# Patient Record
Sex: FEMALE | Race: ASIAN | NOT HISPANIC OR LATINO | Employment: UNEMPLOYED | URBAN - METROPOLITAN AREA
[De-identification: names, ages, dates, MRNs, and addresses within clinical notes are randomized per-mention and may not be internally consistent; named-entity substitution may affect disease eponyms.]

---

## 2023-08-16 ENCOUNTER — OFFICE VISIT (OUTPATIENT)
Dept: FAMILY MEDICINE CLINIC | Facility: CLINIC | Age: 28
End: 2023-08-16

## 2023-08-16 VITALS
HEIGHT: 63 IN | HEART RATE: 72 BPM | WEIGHT: 158 LBS | DIASTOLIC BLOOD PRESSURE: 72 MMHG | SYSTOLIC BLOOD PRESSURE: 116 MMHG | BODY MASS INDEX: 28 KG/M2

## 2023-08-16 DIAGNOSIS — E03.9 HYPOTHYROIDISM, UNSPECIFIED TYPE: Primary | ICD-10-CM

## 2023-08-17 LAB
ALBUMIN SERPL-MCNC: 4.6 G/DL (ref 4–5)
ALBUMIN/GLOB SERPL: 1.6 {RATIO} (ref 1.2–2.2)
ALP SERPL-CCNC: 68 IU/L (ref 44–121)
ALT SERPL-CCNC: 23 IU/L (ref 0–32)
APPEARANCE UR: CLEAR
AST SERPL-CCNC: 19 IU/L (ref 0–40)
BACTERIA #/AREA URNS HPF: ABNORMAL /[HPF]
BASOPHILS # BLD AUTO: 0.1 X10E3/UL (ref 0–0.2)
BASOPHILS NFR BLD AUTO: 1 %
BILIRUB SERPL-MCNC: <0.2 MG/DL (ref 0–1.2)
BILIRUB UR QL STRIP: NEGATIVE
BUN SERPL-MCNC: 7 MG/DL (ref 6–20)
BUN/CREAT SERPL: 11 (ref 9–23)
CALCIUM SERPL-MCNC: 9.4 MG/DL (ref 8.7–10.2)
CASTS URNS QL MICRO: ABNORMAL /LPF
CHLORIDE SERPL-SCNC: 102 MMOL/L (ref 96–106)
CHOLEST SERPL-MCNC: 164 MG/DL (ref 100–199)
CHOLEST/HDLC SERPL: 3.5 RATIO (ref 0–4.4)
CO2 SERPL-SCNC: 24 MMOL/L (ref 20–29)
COLOR UR: YELLOW
CREAT SERPL-MCNC: 0.62 MG/DL (ref 0.57–1)
EGFRCR SERPLBLD CKD-EPI 2021: 124 ML/MIN/1.73
EOSINOPHIL # BLD AUTO: 0.1 X10E3/UL (ref 0–0.4)
EOSINOPHIL NFR BLD AUTO: 1 %
EPI CELLS #/AREA URNS HPF: ABNORMAL /HPF (ref 0–10)
ERYTHROCYTE [DISTWIDTH] IN BLOOD BY AUTOMATED COUNT: 12.2 % (ref 11.7–15.4)
GLOBULIN SER CALC-MCNC: 2.8 G/DL (ref 1.5–4.5)
GLUCOSE SERPL-MCNC: 111 MG/DL (ref 70–99)
GLUCOSE UR QL STRIP: NEGATIVE
HCT VFR BLD AUTO: 36.6 % (ref 34–46.6)
HCV IGG SERPL QL IA: NON REACTIVE
HDLC SERPL-MCNC: 47 MG/DL
HGB BLD-MCNC: 12.2 G/DL (ref 11.1–15.9)
HGB UR QL STRIP: ABNORMAL
IMM GRANULOCYTES # BLD AUTO: 0 X10E3/UL (ref 0–0.1)
IMM GRANULOCYTES NFR BLD AUTO: 0 %
KETONES UR QL STRIP: NEGATIVE
LDLC SERPL CALC-MCNC: 97 MG/DL (ref 0–99)
LEUKOCYTE ESTERASE UR QL STRIP: ABNORMAL
LYMPHOCYTES # BLD AUTO: 3.6 X10E3/UL (ref 0.7–3.1)
LYMPHOCYTES NFR BLD AUTO: 37 %
MCH RBC QN AUTO: 29 PG (ref 26.6–33)
MCHC RBC AUTO-ENTMCNC: 33.3 G/DL (ref 31.5–35.7)
MCV RBC AUTO: 87 FL (ref 79–97)
MICRO URNS: ABNORMAL
MONOCYTES # BLD AUTO: 0.5 X10E3/UL (ref 0.1–0.9)
MONOCYTES NFR BLD AUTO: 5 %
NEUTROPHILS # BLD AUTO: 5.4 X10E3/UL (ref 1.4–7)
NEUTROPHILS NFR BLD AUTO: 56 %
NITRITE UR QL STRIP: NEGATIVE
PH UR STRIP: 7 [PH] (ref 5–7.5)
PLATELET # BLD AUTO: 340 X10E3/UL (ref 150–450)
POTASSIUM SERPL-SCNC: 3.9 MMOL/L (ref 3.5–5.2)
PROT SERPL-MCNC: 7.4 G/DL (ref 6–8.5)
PROT UR QL STRIP: NEGATIVE
RBC # BLD AUTO: 4.21 X10E6/UL (ref 3.77–5.28)
RBC #/AREA URNS HPF: ABNORMAL /HPF (ref 0–2)
SODIUM SERPL-SCNC: 141 MMOL/L (ref 134–144)
SP GR UR STRIP: 1.01 (ref 1–1.03)
T4 FREE SERPL-MCNC: 1.2 NG/DL (ref 0.82–1.77)
TRIGL SERPL-MCNC: 109 MG/DL (ref 0–149)
TSH SERPL DL<=0.005 MIU/L-ACNC: 0.91 UIU/ML (ref 0.45–4.5)
UROBILINOGEN UR STRIP-MCNC: 0.2 MG/DL (ref 0.2–1)
VLDLC SERPL CALC-MCNC: 20 MG/DL (ref 5–40)
WBC # BLD AUTO: 9.6 X10E3/UL (ref 3.4–10.8)
WBC #/AREA URNS HPF: ABNORMAL /HPF (ref 0–5)

## 2024-07-03 ENCOUNTER — OFFICE VISIT (OUTPATIENT)
Dept: FAMILY MEDICINE CLINIC | Facility: CLINIC | Age: 29
End: 2024-07-03
Payer: COMMERCIAL

## 2024-07-03 VITALS
OXYGEN SATURATION: 98 % | DIASTOLIC BLOOD PRESSURE: 78 MMHG | RESPIRATION RATE: 18 BRPM | HEART RATE: 78 BPM | TEMPERATURE: 98 F | SYSTOLIC BLOOD PRESSURE: 116 MMHG | BODY MASS INDEX: 28.88 KG/M2 | WEIGHT: 163 LBS | HEIGHT: 63 IN

## 2024-07-03 DIAGNOSIS — E66.3 OVERWEIGHT (BMI 25.0-29.9): ICD-10-CM

## 2024-07-03 DIAGNOSIS — N92.6 IRREGULAR MENSES: Primary | ICD-10-CM

## 2024-07-03 PROBLEM — E03.9 HYPOTHYROIDISM: Status: RESOLVED | Noted: 2023-08-16 | Resolved: 2024-07-03

## 2024-07-03 PROCEDURE — 99213 OFFICE O/P EST LOW 20 MIN: CPT | Performed by: INTERNAL MEDICINE

## 2024-07-04 PROBLEM — N92.6 IRREGULAR MENSES: Status: ACTIVE | Noted: 2024-07-04

## 2024-07-04 PROBLEM — E66.3 OVERWEIGHT (BMI 25.0-29.9): Status: ACTIVE | Noted: 2024-07-04

## 2024-10-09 ENCOUNTER — LAB (OUTPATIENT)
Dept: FAMILY MEDICINE CLINIC | Facility: CLINIC | Age: 29
End: 2024-10-09
Payer: COMMERCIAL

## 2024-10-09 DIAGNOSIS — N92.6 IRREGULAR MENSES: Primary | ICD-10-CM

## 2024-10-09 DIAGNOSIS — E66.3 OVERWEIGHT (BMI 25.0-29.9): ICD-10-CM

## 2024-10-10 LAB
ALBUMIN SERPL-MCNC: 4.5 G/DL (ref 3.5–5.2)
ALBUMIN/GLOB SERPL: 1.6 G/DL
ALP SERPL-CCNC: 69 U/L (ref 39–117)
ALT SERPL-CCNC: 20 U/L (ref 1–33)
APPEARANCE UR: CLEAR
AST SERPL-CCNC: 20 U/L (ref 1–32)
BACTERIA #/AREA URNS HPF: NORMAL /HPF
BASOPHILS # BLD AUTO: 0.04 10*3/MM3 (ref 0–0.2)
BASOPHILS NFR BLD AUTO: 0.5 % (ref 0–1.5)
BILIRUB SERPL-MCNC: 0.3 MG/DL (ref 0–1.2)
BILIRUB UR QL STRIP: NEGATIVE
BUN SERPL-MCNC: 10 MG/DL (ref 6–20)
BUN/CREAT SERPL: 17.9 (ref 7–25)
CALCIUM SERPL-MCNC: 9.5 MG/DL (ref 8.6–10.5)
CASTS URNS MICRO: NORMAL
CHLORIDE SERPL-SCNC: 102 MMOL/L (ref 98–107)
CHOLEST SERPL-MCNC: 160 MG/DL (ref 0–200)
CO2 SERPL-SCNC: 27.3 MMOL/L (ref 22–29)
COLOR UR: YELLOW
CREAT SERPL-MCNC: 0.56 MG/DL (ref 0.57–1)
EGFRCR SERPLBLD CKD-EPI 2021: 126.1 ML/MIN/1.73
EOSINOPHIL # BLD AUTO: 0.11 10*3/MM3 (ref 0–0.4)
EOSINOPHIL NFR BLD AUTO: 1.5 % (ref 0.3–6.2)
EPI CELLS #/AREA URNS HPF: NORMAL /HPF
ERYTHROCYTE [DISTWIDTH] IN BLOOD BY AUTOMATED COUNT: 11.9 % (ref 12.3–15.4)
GLOBULIN SER CALC-MCNC: 2.8 GM/DL
GLUCOSE SERPL-MCNC: 85 MG/DL (ref 65–99)
GLUCOSE UR QL STRIP: NEGATIVE
HCT VFR BLD AUTO: 38.4 % (ref 34–46.6)
HDLC SERPL-MCNC: 41 MG/DL (ref 40–60)
HGB BLD-MCNC: 12.5 G/DL (ref 12–15.9)
HGB UR QL STRIP: NEGATIVE
IMM GRANULOCYTES # BLD AUTO: 0.01 10*3/MM3 (ref 0–0.05)
IMM GRANULOCYTES NFR BLD AUTO: 0.1 % (ref 0–0.5)
KETONES UR QL STRIP: NEGATIVE
LDLC SERPL CALC-MCNC: 107 MG/DL (ref 0–100)
LDLC/HDLC SERPL: 2.61 {RATIO}
LEUKOCYTE ESTERASE UR QL STRIP: ABNORMAL
LYMPHOCYTES # BLD AUTO: 3.48 10*3/MM3 (ref 0.7–3.1)
LYMPHOCYTES NFR BLD AUTO: 46.7 % (ref 19.6–45.3)
MCH RBC QN AUTO: 28.9 PG (ref 26.6–33)
MCHC RBC AUTO-ENTMCNC: 32.6 G/DL (ref 31.5–35.7)
MCV RBC AUTO: 88.9 FL (ref 79–97)
MONOCYTES # BLD AUTO: 0.35 10*3/MM3 (ref 0.1–0.9)
MONOCYTES NFR BLD AUTO: 4.7 % (ref 5–12)
NEUTROPHILS # BLD AUTO: 3.46 10*3/MM3 (ref 1.7–7)
NEUTROPHILS NFR BLD AUTO: 46.5 % (ref 42.7–76)
NITRITE UR QL STRIP: NEGATIVE
NRBC BLD AUTO-RTO: 0 /100 WBC (ref 0–0.2)
PH UR STRIP: 7 [PH] (ref 5–8)
PLATELET # BLD AUTO: 387 10*3/MM3 (ref 140–450)
POTASSIUM SERPL-SCNC: 4.6 MMOL/L (ref 3.5–5.2)
PROT SERPL-MCNC: 7.3 G/DL (ref 6–8.5)
PROT UR QL STRIP: NEGATIVE
RBC # BLD AUTO: 4.32 10*6/MM3 (ref 3.77–5.28)
RBC #/AREA URNS HPF: NORMAL /HPF
SODIUM SERPL-SCNC: 138 MMOL/L (ref 136–145)
SP GR UR STRIP: 1.01 (ref 1–1.03)
TRIGL SERPL-MCNC: 60 MG/DL (ref 0–150)
UROBILINOGEN UR STRIP-MCNC: ABNORMAL MG/DL
VLDLC SERPL CALC-MCNC: 12 MG/DL (ref 5–40)
WBC # BLD AUTO: 7.45 10*3/MM3 (ref 3.4–10.8)
WBC #/AREA URNS HPF: NORMAL /HPF

## 2024-11-26 ENCOUNTER — OFFICE VISIT (OUTPATIENT)
Dept: FAMILY MEDICINE CLINIC | Facility: CLINIC | Age: 29
End: 2024-11-26
Payer: COMMERCIAL

## 2024-11-26 VITALS
OXYGEN SATURATION: 94 % | SYSTOLIC BLOOD PRESSURE: 118 MMHG | RESPIRATION RATE: 18 BRPM | HEIGHT: 63 IN | TEMPERATURE: 96.4 F | DIASTOLIC BLOOD PRESSURE: 72 MMHG | BODY MASS INDEX: 29.15 KG/M2 | HEART RATE: 72 BPM | WEIGHT: 164.5 LBS

## 2024-11-26 DIAGNOSIS — M54.16 LUMBAR RADICULOPATHY, RIGHT: Primary | ICD-10-CM

## 2024-11-26 PROCEDURE — 99214 OFFICE O/P EST MOD 30 MIN: CPT | Performed by: STUDENT IN AN ORGANIZED HEALTH CARE EDUCATION/TRAINING PROGRAM

## 2024-11-26 NOTE — PROGRESS NOTES
Office Note     Name: Shiela Jaime    : 1995     MRN: [id number]     Chief Complaint  Numbness (Right Leg ) and Back Pain (2 mos goes to cardio stated that she has a pinch nerve )    Subjective     History of Present Illness:  Shiela Jaime is a 29 y.o. female     History of Present Illness  The patient is a 29-year-old female who presents today with concerns of back pain and bilateral leg numbness.    She has been experiencing back pain for approximately 2 months. The pain initially started in the middle of her back but has since shifted to the right side. Despite undergoing 32 sessions of chiropractic treatment, the pain has not subsided. She reports pain on the right side when sitting. She has not sustained any injury to the area.    She began exercising on an elliptical machine on 2024 but stopped after 25 days due to pain. She then tried heat workouts and light yoga, but these did not alleviate the pain. She works from home as an  and spends about an hour sitting each day. She has been using ibuprofen and a heating pad for the past 3 days to manage the pain.    She also reports numbness in her legs while driving and sitting. An x-ray taken by her chiropractor revealed a pinched nerve.       Past Medical History: History reviewed. No pertinent past medical history.    Past Surgical History: History reviewed. No pertinent surgical history.    Immunizations:   Immunization History   Administered Date(s) Administered    COVID-19 (PFIZER) Purple Cap Monovalent 2021, 2021    Influenza Injectable Mdck Pf Quad 2020    Tdap 2021        Medications:   No current outpatient medications on file.    Allergies:   No Known Allergies    Family History:   Family History   Problem Relation Age of Onset    Hypertension Father        Social History:   Social History     Socioeconomic History    Marital status:    Tobacco Use    Smoking status: Never      "Passive exposure: Never    Smokeless tobacco: Never   Vaping Use    Vaping status: Never Used   Substance and Sexual Activity    Alcohol use: Never    Drug use: Never    Sexual activity: Defer         Objective     Vital Signs  /72 (BP Location: Left arm, Patient Position: Sitting, Cuff Size: Adult)   Pulse 72   Temp 96.4 °F (35.8 °C) (Temporal)   Resp 18   Ht 160 cm (62.99\")   Wt 74.6 kg (164 lb 8 oz)   SpO2 94%   BMI 29.15 kg/m²   Estimated body mass index is 29.15 kg/m² as calculated from the following:    Height as of this encounter: 160 cm (62.99\").    Weight as of this encounter: 74.6 kg (164 lb 8 oz).            Physical Exam  Constitutional:       General: She is not in acute distress.  HENT:      Head: Normocephalic and atraumatic.   Pulmonary:      Effort: Pulmonary effort is normal. No respiratory distress.   Musculoskeletal:      Lumbar back: Tenderness present. Positive right straight leg raise test. Negative left straight leg raise test.   Neurological:      General: No focal deficit present.      Mental Status: She is alert and oriented to person, place, and time.   Psychiatric:         Mood and Affect: Mood normal.         Behavior: Behavior normal.         Thought Content: Thought content normal.         Judgment: Judgment normal.          Assessment and Plan     Assessment & Plan  1. Lumbar Radiculopathy  She has been experiencing back pain for almost 2 months, which has worsened despite chiropractic sessions. The pain is now accompanied by numbness in the legs. Physical examination revealed pain in the lower back, particularly on the right side, without any shooting pain down the legs. A conservative approach with physical therapy and over-the-counter pain management was recommended. She was advised to alternate between ibuprofen and Tylenol for pain relief and to apply a warm heating pad to the affected area. If there is no improvement after 6 weeks of physical therapy, an MRI will " be considered to rule out nerve damage. She was also instructed to monitor for red flag symptoms such as night sweats, weight loss, numbness around the buttocks, or urinary incontinence, which would necessitate earlier imaging.             Follow Up  Return if symptoms worsen or fail to improve.      I spent 30 minutes caring for Shiela on this date of service. This time includes time spent by me in the following activities:preparing for the visit, reviewing tests, obtaining and/or reviewing a separately obtained history, performing a medically appropriate examination and/or evaluation , referring and communicating with other health care professionals , and documenting information in the medical record      Mickey Porter MD   [facility]  [address] [place] [id number] [phone]    Patient or patient representative verbalized consent for the use of Ambient Listening during the visit with  Mickey Porter MD for chart documentation. 11/26/2024  15:43 EST

## 2024-12-12 ENCOUNTER — TREATMENT (OUTPATIENT)
Dept: PHYSICAL THERAPY | Facility: CLINIC | Age: 29
End: 2024-12-12
Payer: COMMERCIAL

## 2024-12-12 DIAGNOSIS — M54.41 CHRONIC RIGHT-SIDED LOW BACK PAIN WITH RIGHT-SIDED SCIATICA: Primary | ICD-10-CM

## 2024-12-12 DIAGNOSIS — R29.3 POSTURE ABNORMALITY: ICD-10-CM

## 2024-12-12 DIAGNOSIS — G89.29 CHRONIC RIGHT-SIDED LOW BACK PAIN WITH RIGHT-SIDED SCIATICA: Primary | ICD-10-CM

## 2024-12-12 DIAGNOSIS — R68.89 ACTIVITY INTOLERANCE: ICD-10-CM

## 2024-12-12 DIAGNOSIS — R29.898 IMPAIRED STRENGTH OF HIP MUSCLES: ICD-10-CM

## 2024-12-12 NOTE — PROGRESS NOTES
Physical Therapy Initial Evaluation and Plan of Care  Clinic Location [address], [place] [id number]    Patient: Shiela Jaime   : 1995  Diagnosis/ICD-10 Code:  Chronic right-sided low back pain with right-sided sciatica [M54.41, G89.29]  Referring practitioner: Mickey Porter MD  Date of Initial Visit: 2024  Today's Date: 2024  Patient seen for 1 session         Visit Diagnoses:    ICD-10-CM ICD-9-CM   1. Chronic right-sided low back pain with right-sided sciatica  M54.41 724.2    G89.29 724.3     338.29   2. Activity intolerance  R68.89 780.99   3. Posture abnormality  R29.3 781.92   4. Impaired strength of hip muscles  R29.898 781.99         Subjective Questionnaire: Oswestry: 38%      Subjective Evaluation    History of Present Illness  Mechanism of injury: Pt referred to PT today for LBP with R sided sciatica. She has underwent 32 sessions seeing a chropractor with no relief. Reports symptoms have progressively gotten worse when she was seeing the chiropractor. She reports numbness in her legs on the R when driving or sitting.    Today, she reports the numbness has gotten better some since discontinuing chirpractor. She still experiences pain on the R side of low back. Does yoga and walks which helps. Pain radiates from the low back to the lateral thigh, and she can experience symptoms past the knee soemtimes to her feet.    Reports this all started end of August after doing elliptical workouts at home for 20-25 days. Reports that chiropractor took an x-ray and said she has a pinched nerve.    Pain  Current pain ratin  At worst pain ratin  Quality: sharp and pulling  Relieving factors: heat  Progression: improved    Diagnostic Tests  X-ray: abnormal (pt had image of her XR, hyper lordosis of lumbosacral junction observed.)    Treatments  Previous treatment: chiropractic  Patient Goals  Patient goals for therapy: decreased pain and increased  strength             Objective          Palpation     Right   Hypertonic in the erector spinae and lumbar paraspinals.     Additional Palpation Details  Lower lumbar vertebrae notably deeper than upper lumbar vertebrae which were more superficial.    Active Range of Motion     Lumbar   Flexion: WFL and with pain  Extension: WFL and with pain  Left lateral flexion: WFL and with pain  Right lateral flexion: WFL  Left rotation: WFL  Right rotation: WFL    Additional Active Range of Motion Details  Pain more severe with flexion > extension at lumbar spine. No pain with R SB but pain reproduced L SB.    Strength/Myotome Testing     Left Hip   Planes of Motion   Flexion: WFL  Extension: 4  Abduction: 4-    Right Hip   Planes of Motion   Flexion: 4-  Extension: 4  Abduction: 4-    Left Knee   Normal strength    Right Knee   Normal strength    Left Ankle/Foot   Normal strength  Dorsiflexion: WFL.   Plantar flexion: WFL.     Right Ankle/Foot   Normal strength  Dorsiflexion: WFL.   Plantar flexion: WFL.     Additional Strength Details  Pain reproduced with R hip flexion MMT.    Tests     Lumbar     Left   Negative passive SLR.     Right   Positive passive SLR.     Left Pelvic Girdle/Sacrum   Negative: sacral spring and thigh thrust.     Right Pelvic Girdle/Sacrum   Negative: sacral spring and thigh thrust.     Left Hip   Negative SI compression and SI distraction.     Right Hip   Negative SI compression and SI distraction.     Additional Tests Details  (+) hip flexion over-pressure on R.    Ambulation     Comments   Narrow SOM, scissoring gait observed on R side IC.          Assessment & Plan       Assessment  Impairments: abnormal gait, abnormal muscle tone, activity intolerance, impaired physical strength, lacks appropriate home exercise program and pain with function   Functional limitations: uncomfortable because of pain, standing, stooping and unable to perform repetitive tasks   Assessment details: The patient is a 29  y.o. female who presents to physical therapy today for low back pain with R sided radiculopathy. Upon initial evaluation, the patient demonstrates the following impairments: pain with ROM, LE strength deficits, tenderness to palpation and intolerance to ADLs. Examination findings indicate lumbar radiculopathy due to potential disc pathology and/or biomechanical deficits of the lumbar spine.     Due to these impairments, the patient is unable to perform or has difficulty with the following functional tasks: bending forward, backward, and to the side, sitting prolonged, rolling over in bed, and driving.. The patient would benefit from skilled PT services to address functional limitations and impairments and to improve patient quality of life.      Prognosis: good    Goals  Plan Goals: ST. Pt will be independent and compliant with initial HEP in 4 weeks.  2. Pt will report a 50% improvement in symptoms since starting therapy in 4 weeks.  3. Pt will report pain level at worst <6 during everyday activity in 4 weeks.  4. Pt will show improvement in body mechanics when lifting and sitting in 2 weeks in order to decrease strain on back.     LT. Pt will be independent with final HEP for self-management of condition by DC.  2. Pt will improve score on Back Index to less than 15% by DC.   3. Pt will report a 90% improvement in symptoms by DC in order to allow return to PLOF.  4. Pt will improve lumbar AROM to WNL and pain-free in order to complete ADLs with improved function by DC.     Plan  Therapy options: will be seen for skilled therapy services  Planned modality interventions: dry needling, cryotherapy, iontophoresis, TENS, high voltage pulsed current (pain management), high voltage pulsed current (dermal wound therapy), high voltage pulsed current (spasm management), hydrotherapy, thermotherapy (hydrocollator packs), ultrasound, traction, microcurrent electrical stimulation and electrical stimulation/[place]ian  stimulation  Planned therapy interventions: abdominal trunk stabilization, strengthening, stretching, therapeutic activities, transfer training, spinal/joint mobilization, soft tissue mobilization, postural training, neuromuscular re-education, motor coordination training, manual therapy, ADL retraining, balance/weight-bearing training, body mechanics training, flexibility, functional ROM exercises, gait training, home exercise program, IADL retraining and joint mobilization  Other planned therapy interventions: Group Therapy  Frequency: 2x week  Duration in weeks: 8  Treatment plan discussed with: patient            Timed:         Manual Therapy:         mins  17307;     Therapeutic Exercise:         mins  82000;     Neuromuscular Cari:    10    mins  03523;    Therapeutic Activity:     10     mins  12684;     Gait Training:           mins  59591;     Ultrasound:          mins  07699;    Ionto                                   mins   92313  Self Care                            mins   56508  Canalith Repos         mins 71369      Un-Timed:  Electrical Stimulation:         mins  28320 ( );  Dry Needling          mins self-pay  Traction          mins 14351  Low Eval     20     Mins  78045  Mod Eval          Mins  87021  High Eval                            Mins  29181        Timed Treatment:   20   mins   Total Treatment:     40   mins          PT: Bobby Vuong PT     [place] License #: [id number]  Electronically signed by Bobby Vuong PT, 12/12/24, 11:58 AM EST    Certification Period: 12/12/2024 thru 3/11/2025  I certify that the therapy services are furnished while this patient is under my care.  The services outlined above are required by this patient, and will be reviewed every 90 days.         Physician Signature:__________________________________________________    PHYSICIAN: Mickey Porter MD  NPI: [id number]                                      DATE:      Please sign and return via fax to  [email or web address] . Thank you, [facility].

## 2024-12-12 NOTE — PATIENT INSTRUCTIONS
Access Code: [email or web address]  URL: [email or web address]  Date: 12/12/2024  Prepared by: Bobby Vuong    Exercises  - Supine Posterior Pelvic Tilt  - 1 x daily - 6 x weekly - 3 sets - 10 reps - 5s hold  - Prone Hip Extension  - 1 x daily - 6 x weekly - 3 sets - 10 reps  - Bird Dog  - 1 x daily - 6 x weekly - 3 sets - 10 reps

## 2024-12-17 ENCOUNTER — TREATMENT (OUTPATIENT)
Dept: PHYSICAL THERAPY | Facility: CLINIC | Age: 29
End: 2024-12-17
Payer: COMMERCIAL

## 2024-12-17 DIAGNOSIS — G89.29 CHRONIC RIGHT-SIDED LOW BACK PAIN WITH RIGHT-SIDED SCIATICA: Primary | ICD-10-CM

## 2024-12-17 DIAGNOSIS — R29.3 POSTURE ABNORMALITY: ICD-10-CM

## 2024-12-17 DIAGNOSIS — M54.41 CHRONIC RIGHT-SIDED LOW BACK PAIN WITH RIGHT-SIDED SCIATICA: Primary | ICD-10-CM

## 2024-12-17 DIAGNOSIS — R68.89 ACTIVITY INTOLERANCE: ICD-10-CM

## 2024-12-17 DIAGNOSIS — R29.898 IMPAIRED STRENGTH OF HIP MUSCLES: ICD-10-CM

## 2024-12-17 PROCEDURE — 97110 THERAPEUTIC EXERCISES: CPT

## 2024-12-17 PROCEDURE — 97112 NEUROMUSCULAR REEDUCATION: CPT

## 2024-12-17 PROCEDURE — 97530 THERAPEUTIC ACTIVITIES: CPT

## 2024-12-17 NOTE — PROGRESS NOTES
Physical Therapy Daily Treatment Note  [facility] [place]   [address], [address]  [place] [address]  P: [phone]  F: [phone]    Patient: Shiela Jaime   : 1995  Referring practitioner: Mickey Porter MD  Date of Initial Visit: Type: THERAPY  Noted: 2024  Today's Date: 2024  Patient seen for 2 sessions       Visit Diagnoses:    ICD-10-CM ICD-9-CM   1. Chronic right-sided low back pain with right-sided sciatica  M54.41 724.2    G89.29 724.3     338.29   2. Activity intolerance  R68.89 780.99   3. Posture abnormality  R29.3 781.92   4. Impaired strength of hip muscles  R29.898 781.99         Shiela Jaime reports: continued pain with bending over. Did have some improvement with symptoms while sitting prolonged. Reports continued numbness down lateral R thigh.    Subjective     Objective   See Exercise, Manual, and Modality Logs for complete treatment.       Assessment:  Pt tolerated today's treatment session well with no adverse responses during treatment session. Pt continues to display limitations including LBP with radiculopathy impacting her tolerance to ADLs. HEP progressed today with nerve glide and bridge. Pt will benefit from continued skilled PT services.       Plan:  Progress per POC.         Timed:         Manual Therapy:         mins  74721;     Therapeutic Exercise:    20     mins  54839;     Neuromuscular Cari:    10    mins  63095;    Therapeutic Activity:     8     mins  83684;     Gait Training:           mins  59150;     Ultrasound:          mins  05091;    Ionto                                   mins  20934  Self Care                            mins  34927  Traction          mins 81753      Un-Timed:  Canalith Repos         mins 54394  Electrical Stimulation:         mins  44029 (MC );  Dry Needling          mins self-pay  Traction          mins 42500        Timed Treatment:   38   mins   Total Treatment:     38    gordo Vuong, PT  [place] License #: [id number]    Physical Therapist

## 2024-12-19 ENCOUNTER — TREATMENT (OUTPATIENT)
Dept: PHYSICAL THERAPY | Facility: CLINIC | Age: 29
End: 2024-12-19
Payer: COMMERCIAL

## 2024-12-19 DIAGNOSIS — R29.898 IMPAIRED STRENGTH OF HIP MUSCLES: ICD-10-CM

## 2024-12-19 DIAGNOSIS — R68.89 ACTIVITY INTOLERANCE: ICD-10-CM

## 2024-12-19 DIAGNOSIS — G89.29 CHRONIC RIGHT-SIDED LOW BACK PAIN WITH RIGHT-SIDED SCIATICA: Primary | ICD-10-CM

## 2024-12-19 DIAGNOSIS — M54.41 CHRONIC RIGHT-SIDED LOW BACK PAIN WITH RIGHT-SIDED SCIATICA: Primary | ICD-10-CM

## 2024-12-19 DIAGNOSIS — R29.3 POSTURE ABNORMALITY: ICD-10-CM

## 2024-12-19 NOTE — PROGRESS NOTES
Physical Therapy Daily Treatment Note  [facility] [place]   [address], [address]  [place] [address]  P: [phone]  F: [phone]    Patient: Shiela Jaime   : 1995  Referring practitioner: Mickey Porter MD  Date of Initial Visit: Type: THERAPY  Noted: 2024  Today's Date: 2024  Patient seen for 3 sessions       Visit Diagnoses:    ICD-10-CM ICD-9-CM   1. Chronic right-sided low back pain with right-sided sciatica  M54.41 724.2    G89.29 724.3     338.29   2. Activity intolerance  R68.89 780.99   3. Posture abnormality  R29.3 781.92   4. Impaired strength of hip muscles  R29.898 781.99         Shiela Jaime reports: continued symptoms of LBP and numbness down the R thigh. She reporets pain does not occur as frequently as it was prior to beginning PT but her symptoms do at times feel more severe. She reports symptom exacerbation with prolonged standing and prolonged sitting, especially when sitting in car driving.    Subjective     Objective   See Exercise, Manual, and Modality Logs for complete treatment.     Pain exacerbations observed with anterior pelvic tilts causing associated lumbar lordosis.    Assessment:  Pt tolerated today's treatment session well with no adverse responses during treatment session. Pt continues to display limitations including low back pain with radiculopathy down R leg. Pts symptoms can be exacerbated with prolonged positioning, most notably when she is in standing and performs an anterior pelvic tilt. She was educated today thoroughly on proper posture and body mechanics, especially when at work where she is required to sit for prolonged periods on an unsupportive stool. Pt will benefit from continued skilled PT services.       Plan:  Progress per POC.         Timed:         Manual Therapy:         mins  01578;     Therapeutic Exercise:    15     mins  59423;     Neuromuscular Cari:    11    mins  75787;     Therapeutic Activity:     14     mins  57716;     Gait Training:           mins  82627;     Ultrasound:          mins  91801;    Ionto                                   mins  98742  Self Care                            mins  00399  Traction          mins 62091      Un-Timed:  Canalith Repos         mins 57933  Electrical Stimulation:         mins  70456 ( );  Dry Needling          mins self-pay  Traction          mins 56819        Timed Treatment:   40   mins   Total Treatment:     40   mins    Bobby Vuong, PT  [place] License #: [id number]    Physical Therapist

## 2024-12-31 ENCOUNTER — TREATMENT (OUTPATIENT)
Dept: PHYSICAL THERAPY | Facility: CLINIC | Age: 29
End: 2024-12-31
Payer: COMMERCIAL

## 2024-12-31 DIAGNOSIS — G89.29 CHRONIC RIGHT-SIDED LOW BACK PAIN WITH RIGHT-SIDED SCIATICA: Primary | ICD-10-CM

## 2024-12-31 DIAGNOSIS — M54.41 CHRONIC RIGHT-SIDED LOW BACK PAIN WITH RIGHT-SIDED SCIATICA: Primary | ICD-10-CM

## 2024-12-31 DIAGNOSIS — R68.89 ACTIVITY INTOLERANCE: ICD-10-CM

## 2024-12-31 DIAGNOSIS — R29.3 POSTURE ABNORMALITY: ICD-10-CM

## 2024-12-31 DIAGNOSIS — R29.898 IMPAIRED STRENGTH OF HIP MUSCLES: ICD-10-CM

## 2024-12-31 PROCEDURE — 97110 THERAPEUTIC EXERCISES: CPT

## 2024-12-31 PROCEDURE — 97530 THERAPEUTIC ACTIVITIES: CPT

## 2024-12-31 PROCEDURE — 97012 MECHANICAL TRACTION THERAPY: CPT

## 2024-12-31 PROCEDURE — 97112 NEUROMUSCULAR REEDUCATION: CPT

## 2024-12-31 NOTE — PROGRESS NOTES
Physical Therapy Daily Treatment Note  [facility] [place]   [address], [address]  [place] [address]  P: [phone]  F: [phone]    Patient: Shiela Jaime   : 1995  Referring practitioner: Mickey Porter MD  Date of Initial Visit: Type: THERAPY  Noted: 2024  Today's Date: 2024  Patient seen for 4 sessions       Visit Diagnoses:    ICD-10-CM ICD-9-CM   1. Chronic right-sided low back pain with right-sided sciatica  M54.41 724.2    G89.29 724.3     338.29   2. Activity intolerance  R68.89 780.99   3. Posture abnormality  R29.3 781.92   4. Impaired strength of hip muscles  R29.898 781.99         Shiela Jaime reports: she continues to experience sharp pain that are maybe worse than when she started, but symptoms are not as frequent. Reports symptom onset with prolonged sitting. Usually occur after 10-15 minutes on the couch or when in the car.    Subjective     Objective   See Exercise, Manual, and Modality Logs for complete treatment.       Assessment:  Pt tolerated today's treatment session well with no adverse responses during treatment session. Pt continues to experience symptoms of radiculopathy down R LE with associated LBP. Pt was educated thoroughly today on her POC, treatment options, and strategies to mitigate pain. [place] carry exercise and resisted sidesteps added to HEP. Pt also tolerated traction today well w/out complaints. Pt will benefit from continued skilled PT services.       Plan:  Progress per POC.         Timed:         Manual Therapy:         mins  02809;     Therapeutic Exercise:    13     mins  33286;     Neuromuscular Cari:    10    mins  79930;    Therapeutic Activity:     20     mins  64914;     Gait Training:           mins  19027;     Ultrasound:          mins  60143;    Ionto                                   mins  70063  Self Care                            mins  24157  Traction          mins  76848      Un-Timed:  Canalith Repos         mins 14191  Electrical Stimulation:         mins  80814 ( );  Dry Needling          mins self-pay  Traction     15     mins 24945        Timed Treatment:   43   mins   Total Treatment:     60   mins    Bobby Vuong PT  [place] License #: [id number]    Physical Therapist       116

## 2025-01-14 ENCOUNTER — TREATMENT (OUTPATIENT)
Dept: PHYSICAL THERAPY | Facility: CLINIC | Age: 30
End: 2025-01-14
Payer: COMMERCIAL

## 2025-01-14 DIAGNOSIS — R68.89 ACTIVITY INTOLERANCE: ICD-10-CM

## 2025-01-14 DIAGNOSIS — R29.3 POSTURE ABNORMALITY: ICD-10-CM

## 2025-01-14 DIAGNOSIS — R29.898 IMPAIRED STRENGTH OF HIP MUSCLES: ICD-10-CM

## 2025-01-14 DIAGNOSIS — G89.29 CHRONIC RIGHT-SIDED LOW BACK PAIN WITH RIGHT-SIDED SCIATICA: Primary | ICD-10-CM

## 2025-01-14 DIAGNOSIS — M54.41 CHRONIC RIGHT-SIDED LOW BACK PAIN WITH RIGHT-SIDED SCIATICA: Primary | ICD-10-CM

## 2025-01-14 PROCEDURE — 97530 THERAPEUTIC ACTIVITIES: CPT

## 2025-01-14 PROCEDURE — 97112 NEUROMUSCULAR REEDUCATION: CPT

## 2025-01-14 PROCEDURE — 97110 THERAPEUTIC EXERCISES: CPT

## 2025-01-14 NOTE — PROGRESS NOTES
Re-Assessment / Re-Certification    Time In 1033     Time Out 1118    Patient: Shiela Jaime   : 1995  Diagnosis/ICD-10 Code:  Chronic right-sided low back pain with right-sided sciatica [M54.41, G89.29]  Referring practitioner: Mickey Porter MD  Date of Initial Visit: Type: THERAPY  Noted: 2024  Today's Date: 2025  Patient seen for 5 sessions      Subjective:   Shiela Jaime reports: no changes since last visit. Overall, reports not experiencing pain as much and not as frequent. Pain does however still occur, especially when sitting more on her R side. Reports symptoms down the leg are so much reduced. Reports she had numbness there all the time but now this is much better, still occurs at times. She does feel she has gotten stronger. When pain does occur it is still sharp, rated at a 7/10.  Subjective Questionnaire: Oswestry: 28%  Clinical Progress: improved  Home Program Compliance: Yes  Treatment has included: therapeutic exercise, neuromuscular re-education, manual therapy, and therapeutic activity, and traction       Objective          Active Range of Motion     Lumbar   Flexion: WFL and with pain  Extension: WFL  Left lateral flexion: WFL  Right lateral flexion: WFL  Left rotation: WFL  Right rotation: WFL    Strength/Myotome Testing     Left Hip   Planes of Motion   Flexion: 4  Extension: 4+  Abduction: 4+  Adduction: 4+    Right Hip   Planes of Motion   Flexion: 4  Extension: 4+  Abduction: 4+  Adduction: 4+    Left Knee   Normal strength    Right Knee   Normal strength      Pt displays poor ability to dead lift without using low back.    Assessment/Plan  Pt tolerated treatment well today. Minor pain exacerbations occurred when she would bend forward at the low back. She was educated thoroughly on importance of proper body mechanics and demonstrated ability to properly perform a dead lift to reduce the strain on her low back after mod-max verbal cues. Continued PT is  indicated to further progress her strength and body mechanics with functional activities in order to reduce her symptoms and improve her QoL.    Progress toward previous goals: Partially Met    Goals  Plan Goals: ST. Pt will be independent and compliant with initial HEP in 4 weeks. (Met)  2. Pt will report a 50% improvement in symptoms since starting therapy in 4 weeks. (Met)  3. Pt will report pain level at worst <6 during everyday activity in 4 weeks. (Not met)  4. Pt will show improvement in body mechanics when lifting and sitting in 2 weeks in order to decrease strain on back.  (Not met)     LT. Pt will be independent with final HEP for self-management of condition by DC.  2. Pt will improve score on Back Index to less than 15% by DC.   3. Pt will report a 90% improvement in symptoms by DC in order to allow return to PLOF.  4. Pt will improve lumbar AROM to WNL and pain-free in order to complete ADLs with improved function by DC.   (ALL PROGRESSING)      Recommendations: Continue as planned  Timeframe: 1 month  Prognosis to achieve goals: good    PT Signature: Bobby Vuong, PT  [place] License #: [id number]    Based upon review of the patient's progress and continued therapy plan, it is my medical opinion that Shiela Jaime should continue physical therapy treatment at [facility]  [address] [place] [address] [phone].    Signature: __________________________________  Mickey Porter MD    Manual Therapy:         mins  96260;  Therapeutic Exercise:    25     mins  48723;     Neuromuscular Cari:    10    mins  39310;    Therapeutic Activity:     10     mins  08953;     Gait Training:           mins  72923;     Ultrasound:          mins  93922;    Electrical Stimulation:         mins  80719 ( );  Dry Needling          mins self-pay    Timed Treatment:   45   mins   Total Treatment:     45   mins

## 2025-01-21 ENCOUNTER — TREATMENT (OUTPATIENT)
Dept: PHYSICAL THERAPY | Facility: CLINIC | Age: 30
End: 2025-01-21
Payer: COMMERCIAL

## 2025-01-21 DIAGNOSIS — R68.89 ACTIVITY INTOLERANCE: ICD-10-CM

## 2025-01-21 DIAGNOSIS — R29.3 POSTURE ABNORMALITY: ICD-10-CM

## 2025-01-21 DIAGNOSIS — R29.898 IMPAIRED STRENGTH OF HIP MUSCLES: ICD-10-CM

## 2025-01-21 DIAGNOSIS — M54.41 CHRONIC RIGHT-SIDED LOW BACK PAIN WITH RIGHT-SIDED SCIATICA: Primary | ICD-10-CM

## 2025-01-21 DIAGNOSIS — G89.29 CHRONIC RIGHT-SIDED LOW BACK PAIN WITH RIGHT-SIDED SCIATICA: Primary | ICD-10-CM

## 2025-01-21 PROCEDURE — 97112 NEUROMUSCULAR REEDUCATION: CPT

## 2025-01-21 PROCEDURE — 97110 THERAPEUTIC EXERCISES: CPT

## 2025-01-21 NOTE — PROGRESS NOTES
Physical Therapy Daily Treatment Note  [facility] [place]   [address], [address]  [place] [address]  P: [phone]  F: [phone]    Patient: Shiela Jaime   : 1995  Referring practitioner: Mickey Porter MD  Date of Initial Visit: Type: THERAPY  Noted: 2024  Today's Date: 2025  Patient seen for 6 sessions       Visit Diagnoses:    ICD-10-CM ICD-9-CM   1. Chronic right-sided low back pain with right-sided sciatica  M54.41 724.2    G89.29 724.3     338.29   2. Activity intolerance  R68.89 780.99   3. Posture abnormality  R29.3 781.92   4. Impaired strength of hip muscles  R29.898 781.99         Shiela Jaime reports: she was very sore following last treatment session. No increases of her normal pain. Continues to notice improvement of radicular symptoms at the leg.    Subjective     Objective   See Exercise, Manual, and Modality Logs for complete treatment.     Mod-max VC for proper dead lift technique.    Assessment:  Pt tolerated today's treatment session well with no adverse responses during treatment session. Pt continues to display limitations including strength deficits and pain with certain motions impacting her tolerance to ADLs and QoL. She appears to be improving and has less subjective reports of pain. Dead lifts and planks added to HEP today.. Pt will benefit from continued skilled PT services.       Plan:  Progress per POC.         Timed:         Manual Therapy:         mins  15663;     Therapeutic Exercise:    25     mins  50247;     Neuromuscular Cari:    10    mins  75748;    Therapeutic Activity:          mins  25413;     Gait Training:           mins  54257;     Ultrasound:          mins  51735;    Ionto                                   mins  44573  Self Care                            mins  08424  Traction          mins 18376      Un-Timed:  Canalith Repos         mins 90835  Electrical Stimulation:         mins  94894  ( );  Dry Needling          mins self-pay  Traction          mins 73800        Timed Treatment:   35   mins   Total Treatment:     35   mins    Bobby Vuong, PT  [place] License #: [id number]    Physical Therapist

## 2025-01-28 ENCOUNTER — TREATMENT (OUTPATIENT)
Dept: PHYSICAL THERAPY | Facility: CLINIC | Age: 30
End: 2025-01-28
Payer: COMMERCIAL

## 2025-01-28 DIAGNOSIS — R29.3 POSTURE ABNORMALITY: ICD-10-CM

## 2025-01-28 DIAGNOSIS — R29.898 IMPAIRED STRENGTH OF HIP MUSCLES: ICD-10-CM

## 2025-01-28 DIAGNOSIS — G89.29 CHRONIC RIGHT-SIDED LOW BACK PAIN WITH RIGHT-SIDED SCIATICA: Primary | ICD-10-CM

## 2025-01-28 DIAGNOSIS — R68.89 ACTIVITY INTOLERANCE: ICD-10-CM

## 2025-01-28 DIAGNOSIS — M54.41 CHRONIC RIGHT-SIDED LOW BACK PAIN WITH RIGHT-SIDED SCIATICA: Primary | ICD-10-CM

## 2025-01-28 NOTE — PROGRESS NOTES
Physical Therapy Daily Treatment Note  [facility] [place]   [address], [address]  [place] [address]  P: [phone]  F: [phone]    Patient: Shiela Jaime   : 1995  Referring practitioner: Mickey Porter MD  Date of Initial Visit: Type: THERAPY  Noted: 2024  Today's Date: 2025  Patient seen for 7 sessions       Visit Diagnoses:    ICD-10-CM ICD-9-CM   1. Chronic right-sided low back pain with right-sided sciatica  M54.41 724.2    G89.29 724.3     338.29   2. Activity intolerance  R68.89 780.99   3. Posture abnormality  R29.3 781.92   4. Impaired strength of hip muscles  R29.898 781.99         Shiela Jaime reports: no changes in her back symptoms, continues to experience pain when bending over. Pt reports some L wrist/hand pain following last session due to holding heavy weight.    Subjective     Objective   See Exercise, Manual, and Modality Logs for complete treatment.     Mod-max VC and TC required to perform exercises correctly with proper technique.    Assessment:  Pt tolerated today's treatment session well with no adverse responses during treatment session. Pt continues to display limitations including LE and core strength deficits with associated LBP. Her symptoms have been improving and she has displayed improved ability to perform exercises in the clinic. Pt will benefit from continued skilled PT services.       Plan:  Progress per POC.         Timed:         Manual Therapy:         mins  68915;     Therapeutic Exercise:    20     mins  17011;     Neuromuscular Cari:    10    mins  94915;    Therapeutic Activity:     10     mins  20759;     Gait Training:           mins  99818;     Ultrasound:          mins  13236;    Ionto                                   mins  59246  Self Care                            mins  67102  Traction          mins 80089      Un-Timed:  Canalith Repos         mins 60342  Electrical Stimulation:          mins  30867 ( );  Dry Needling          mins self-pay  Traction          mins 45224        Timed Treatment:   40   mins   Total Treatment:     45   mins    Bobby Vuong, PT  [place] License #: [id number]    Physical Therapist

## 2025-03-17 ENCOUNTER — OFFICE VISIT (OUTPATIENT)
Dept: FAMILY MEDICINE CLINIC | Facility: CLINIC | Age: 30
End: 2025-03-17
Payer: COMMERCIAL

## 2025-03-17 VITALS
RESPIRATION RATE: 14 BRPM | SYSTOLIC BLOOD PRESSURE: 114 MMHG | HEIGHT: 63 IN | TEMPERATURE: 96.8 F | HEART RATE: 63 BPM | OXYGEN SATURATION: 99 % | BODY MASS INDEX: 29.66 KG/M2 | WEIGHT: 167.4 LBS | DIASTOLIC BLOOD PRESSURE: 80 MMHG

## 2025-03-17 DIAGNOSIS — Z00.00 ANNUAL PHYSICAL EXAM: Primary | ICD-10-CM

## 2025-03-17 PROCEDURE — 99395 PREV VISIT EST AGE 18-39: CPT | Performed by: INTERNAL MEDICINE

## 2025-03-17 NOTE — PROGRESS NOTES
Subjective   Shiela Jaime is a 29 y.o. female who presents for annual female wellness exam.  Chief Complaint   Patient presents with    Annual Exam     Pt here for physical hasn't had labs but is fasting, complains of back pain for 6 months     Hypothyroidism    Back Pain   Patient is here today for annual physical.  She reports to be doing well overall, no new complaint today.  She reports chronic low back pain has significantly improved since completing PT and is now doing home physical therapy exercises.    ROS: Negative for all systems reviewed  Social history: No smoking, alcohol or illicit drug use  Diet: tries to maintain a healthy diet  Exercise: exercise daily at the gym  Feels safe at home and denies any form of abuse  Last dental exam:  more than 6 months ago  Eye exam: 4 or 5 years ago      Mammogram: Not due for age  Pap Smear: Up to date  Bone Density: Not due for age  Colon Cancer Screening: Not due for age    Immunization History   Administered Date(s) Administered    COVID-19 (PFIZER) Purple Cap Monovalent 08/27/2021, 09/17/2021    Influenza Injectable Mdck Pf Quad 12/11/2020    Tdap 05/03/2021       The following portions of the patient's history were reviewed and updated as appropriate: allergies, current medications, past family history, past medical history, past social history, past surgical history and problem list.    History reviewed. No pertinent past medical history.    History reviewed. No pertinent surgical history.    Family History   Problem Relation Age of Onset    Hypertension Father        Social History     Socioeconomic History    Marital status:    Tobacco Use    Smoking status: Never     Passive exposure: Never    Smokeless tobacco: Never   Vaping Use    Vaping status: Never Used   Substance and Sexual Activity    Alcohol use: Never    Drug use: Never    Sexual activity: Defer         Objective   Vitals:    03/17/25 1052   BP: 114/80   Pulse: 63   Resp: 14   Temp:  96.8 °F (36 °C)   SpO2: 99%     Body mass index is 29.66 kg/m².  Physical Exam  Constitutional:       Appearance: Normal appearance.   HENT:      Head: Normocephalic and atraumatic.      Mouth/Throat:      Mouth: Mucous membranes are moist.   Eyes:      Conjunctiva/sclera: Conjunctivae normal.   Cardiovascular:      Rate and Rhythm: Normal rate and regular rhythm.      Heart sounds: Normal heart sounds.   Pulmonary:      Breath sounds: Normal breath sounds.   Abdominal:      General: Bowel sounds are normal. There is no distension.      Palpations: Abdomen is soft.      Tenderness: There is no abdominal tenderness.   Musculoskeletal:      Right lower leg: No edema.      Left lower leg: No edema.   Neurological:      General: No focal deficit present.      Mental Status: She is alert and oriented to person, place, and time.   Psychiatric:         Mood and Affect: Mood normal.         The following data was reviewed by: Ninoska Nelson MD on 03/17/2025:           Assessment & Plan   Diagnoses and all orders for this visit:    1. Annual physical exam (Primary)  Assessment & Plan:  Up-to-date with age-appropriate vaccination.    Up-to-date with Pap smear screening.    To get routine blood work today.    Discussed the importance of maintaining a healthy weight and getting regular exercise.  Educated patient on the benefits of healthy diet.  Advise follow-up annually for wellness exams.    Orders:  -     CBC & Differential  -     Comprehensive Metabolic Panel  -     Lipid Panel With / Chol / HDL Ratio  -     TSH  -     Hemoglobin A1c  -     Vitamin D,25-Hydroxy  -     Urinalysis With Microscopic - Urine, Clean Catch                      Return in about 1 year (around 3/17/2026) for Annual physical, Follow up with fasting labs.

## 2025-03-17 NOTE — ASSESSMENT & PLAN NOTE
Up-to-date with age-appropriate vaccination.    Up-to-date with Pap smear screening.    To get routine blood work today.    Discussed the importance of maintaining a healthy weight and getting regular exercise.  Educated patient on the benefits of healthy diet.  Advise follow-up annually for wellness exams.

## 2025-03-18 LAB
25(OH)D3+25(OH)D2 SERPL-MCNC: 17.6 NG/ML (ref 30–100)
ALBUMIN SERPL-MCNC: 4.6 G/DL (ref 3.5–5.2)
ALBUMIN/GLOB SERPL: 1.7 G/DL
ALP SERPL-CCNC: 70 U/L (ref 39–117)
ALT SERPL-CCNC: 26 U/L (ref 1–33)
APPEARANCE UR: CLEAR
AST SERPL-CCNC: 23 U/L (ref 1–32)
BACTERIA #/AREA URNS HPF: NORMAL /HPF
BASOPHILS # BLD AUTO: 0.05 10*3/MM3 (ref 0–0.2)
BASOPHILS NFR BLD AUTO: 0.7 % (ref 0–1.5)
BILIRUB SERPL-MCNC: 0.3 MG/DL (ref 0–1.2)
BILIRUB UR QL STRIP: NEGATIVE
BUN SERPL-MCNC: 7 MG/DL (ref 6–20)
BUN/CREAT SERPL: 12.7 (ref 7–25)
CALCIUM SERPL-MCNC: 9.4 MG/DL (ref 8.6–10.5)
CASTS URNS MICRO: NORMAL
CHLORIDE SERPL-SCNC: 103 MMOL/L (ref 98–107)
CHOLEST SERPL-MCNC: 167 MG/DL (ref 0–200)
CHOLEST/HDLC SERPL: 3.55 {RATIO}
CO2 SERPL-SCNC: 26.1 MMOL/L (ref 22–29)
COLOR UR: YELLOW
CREAT SERPL-MCNC: 0.55 MG/DL (ref 0.57–1)
EGFRCR SERPLBLD CKD-EPI 2021: 126.6 ML/MIN/1.73
EOSINOPHIL # BLD AUTO: 0.14 10*3/MM3 (ref 0–0.4)
EOSINOPHIL NFR BLD AUTO: 1.9 % (ref 0.3–6.2)
EPI CELLS #/AREA URNS HPF: NORMAL /HPF
ERYTHROCYTE [DISTWIDTH] IN BLOOD BY AUTOMATED COUNT: 11.8 % (ref 12.3–15.4)
GLOBULIN SER CALC-MCNC: 2.7 GM/DL
GLUCOSE SERPL-MCNC: 85 MG/DL (ref 65–99)
GLUCOSE UR QL STRIP: NEGATIVE
HBA1C MFR BLD: 5.3 % (ref 4.8–5.6)
HCT VFR BLD AUTO: 38.6 % (ref 34–46.6)
HDLC SERPL-MCNC: 47 MG/DL (ref 40–60)
HGB BLD-MCNC: 12.8 G/DL (ref 12–15.9)
HGB UR QL STRIP: ABNORMAL
IMM GRANULOCYTES # BLD AUTO: 0 10*3/MM3 (ref 0–0.05)
IMM GRANULOCYTES NFR BLD AUTO: 0 % (ref 0–0.5)
KETONES UR QL STRIP: NEGATIVE
LDLC SERPL CALC-MCNC: 108 MG/DL (ref 0–100)
LEUKOCYTE ESTERASE UR QL STRIP: NEGATIVE
LYMPHOCYTES # BLD AUTO: 3.27 10*3/MM3 (ref 0.7–3.1)
LYMPHOCYTES NFR BLD AUTO: 44.9 % (ref 19.6–45.3)
MCH RBC QN AUTO: 29 PG (ref 26.6–33)
MCHC RBC AUTO-ENTMCNC: 33.2 G/DL (ref 31.5–35.7)
MCV RBC AUTO: 87.5 FL (ref 79–97)
MONOCYTES # BLD AUTO: 0.46 10*3/MM3 (ref 0.1–0.9)
MONOCYTES NFR BLD AUTO: 6.3 % (ref 5–12)
NEUTROPHILS # BLD AUTO: 3.37 10*3/MM3 (ref 1.7–7)
NEUTROPHILS NFR BLD AUTO: 46.2 % (ref 42.7–76)
NITRITE UR QL STRIP: NEGATIVE
NRBC BLD AUTO-RTO: 0 /100 WBC (ref 0–0.2)
PH UR STRIP: 7 [PH] (ref 5–8)
PLATELET # BLD AUTO: 369 10*3/MM3 (ref 140–450)
POTASSIUM SERPL-SCNC: 4.4 MMOL/L (ref 3.5–5.2)
PROT SERPL-MCNC: 7.3 G/DL (ref 6–8.5)
PROT UR QL STRIP: NEGATIVE
RBC # BLD AUTO: 4.41 10*6/MM3 (ref 3.77–5.28)
RBC #/AREA URNS HPF: NORMAL /HPF
SODIUM SERPL-SCNC: 139 MMOL/L (ref 136–145)
SP GR UR STRIP: 1.01 (ref 1–1.03)
TRIGL SERPL-MCNC: 63 MG/DL (ref 0–150)
TSH SERPL DL<=0.005 MIU/L-ACNC: 1.58 UIU/ML (ref 0.27–4.2)
UROBILINOGEN UR STRIP-MCNC: ABNORMAL MG/DL
VLDLC SERPL CALC-MCNC: 12 MG/DL (ref 5–40)
WBC # BLD AUTO: 7.29 10*3/MM3 (ref 3.4–10.8)
WBC #/AREA URNS HPF: NORMAL /HPF